# Patient Record
Sex: FEMALE
[De-identification: names, ages, dates, MRNs, and addresses within clinical notes are randomized per-mention and may not be internally consistent; named-entity substitution may affect disease eponyms.]

---

## 2018-10-20 ENCOUNTER — HOSPITAL ENCOUNTER (EMERGENCY)
Dept: HOSPITAL 88 - ER | Age: 73
Discharge: HOME | End: 2018-10-20
Payer: COMMERCIAL

## 2018-10-20 VITALS — BODY MASS INDEX: 34.15 KG/M2 | HEIGHT: 64 IN | WEIGHT: 200 LBS
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
## 2018-10-20 DIAGNOSIS — Z87.891: ICD-10-CM

## 2018-10-20 DIAGNOSIS — S42.031A: Primary | ICD-10-CM
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
## 2018-10-20 PROCEDURE — 71046 X-RAY EXAM CHEST 2 VIEWS: CPT

## 2018-10-20 PROCEDURE — 99284 EMERGENCY DEPT VISIT MOD MDM: CPT

## 2018-10-20 NOTE — DIAGNOSTIC IMAGING REPORT
SHOULDER RIGHT COMPLETE - 2 views



HISTORY:  Pain. Fell.

COMPARISON: None available.

     

FINDINGS:

Bones:

Mild comminuted distal right clavicular fracture without involvement of the AC

joint. However, this does extend to the coracoclavicular joint.

   



Joints:

The joint spaces are well-maintained.



Soft tissues:

The soft tissues appear unremarkable.





IMPRESSION: 

Comminuted distal clavicular fracture with involvement of the coracoid

clavicular joint.



Signed by: Dr. Roberto Galeas M.D. on 10/20/2018 3:58 PM

## 2018-10-20 NOTE — DIAGNOSTIC IMAGING REPORT
EXAMINATION:  CHEST 2 VIEWS    



INDICATION:      ^Trauma, fall. conern left clavicle fracture



COMPARISON:  None

     

FINDINGS:  PA and lateral views



TUBES and LINES:  None.



LUNGS:  Lungs are well inflated.  There are bibasilar atelectasis.  There is no

evidence of pneumonia or pulmonary edema.



PLEURA:  No pleural effusion or pneumothorax.



HEART AND MEDIASTINUM:  The cardiomediastinal silhouette is unremarkable mild

mild cardiomegaly.    



BONES AND SOFT TISSUES:  Distal right clavicular fracture.  Soft tissues are

unremarkable.



UPPER ABDOMEN: No free air under the diaphragm.    



IMPRESSION: 

Distal right clavicular fracture.





Signed by: Dr. Roberto Galeas M.D. on 10/20/2018 3:56 PM

## 2020-07-18 ENCOUNTER — HOSPITAL ENCOUNTER (EMERGENCY)
Dept: HOSPITAL 88 - ER | Age: 75
Discharge: HOME | End: 2020-07-18
Payer: COMMERCIAL

## 2020-07-18 VITALS — BODY MASS INDEX: 34.15 KG/M2 | HEIGHT: 64 IN | WEIGHT: 200 LBS

## 2020-07-18 DIAGNOSIS — J40: Primary | ICD-10-CM

## 2020-07-18 DIAGNOSIS — R50.9: ICD-10-CM

## 2020-07-18 DIAGNOSIS — E78.5: ICD-10-CM

## 2020-07-18 DIAGNOSIS — R53.1: ICD-10-CM

## 2020-07-18 DIAGNOSIS — R53.81: ICD-10-CM

## 2020-07-18 DIAGNOSIS — N39.0: ICD-10-CM

## 2020-07-18 LAB
ALBUMIN SERPL-MCNC: 3 G/DL (ref 3.5–5)
ALBUMIN/GLOB SERPL: 0.8 {RATIO} (ref 0.8–2)
ALP SERPL-CCNC: 94 IU/L (ref 40–150)
ALT SERPL-CCNC: 15 IU/L (ref 0–55)
ANION GAP SERPL CALC-SCNC: 12.9 MMOL/L (ref 8–16)
BACTERIA URNS QL MICRO: (no result) /HPF
BASOPHILS # BLD AUTO: 0.1 10*3/UL (ref 0–0.1)
BASOPHILS NFR BLD AUTO: 0.4 % (ref 0–1)
BILIRUB UR QL: NEGATIVE
BUN SERPL-MCNC: 9 MG/DL (ref 7–26)
BUN/CREAT SERPL: 11 (ref 6–25)
CALCIUM SERPL-MCNC: 8.9 MG/DL (ref 8.4–10.2)
CHLORIDE SERPL-SCNC: 100 MMOL/L (ref 98–107)
CK MB SERPL-MCNC: 0.3 NG/ML (ref 0–5)
CK SERPL-CCNC: 32 IU/L (ref 29–168)
CLARITY UR: CLEAR
CO2 SERPL-SCNC: 26 MMOL/L (ref 22–29)
COLOR UR: YELLOW
DEPRECATED APTT PLAS QN: 29.7 SECONDS (ref 23.8–35.5)
DEPRECATED INR PLAS: 0.93
DEPRECATED NEUTROPHILS # BLD AUTO: 12.4 10*3/UL (ref 2.1–6.9)
DEPRECATED RBC URNS MANUAL-ACNC: (no result) /HPF (ref 0–5)
EGFRCR SERPLBLD CKD-EPI 2021: > 60 ML/MIN (ref 60–?)
EOSINOPHIL # BLD AUTO: 0 10*3/UL (ref 0–0.4)
EOSINOPHIL NFR BLD AUTO: 0.1 % (ref 0–6)
EPI CELLS URNS QL MICRO: (no result) /LPF
ERYTHROCYTE [DISTWIDTH] IN CORD BLOOD: 14.8 % (ref 11.7–14.4)
GLOBULIN PLAS-MCNC: 3.9 G/DL (ref 2.3–3.5)
GLUCOSE SERPLBLD-MCNC: 89 MG/DL (ref 74–118)
HCT VFR BLD AUTO: 44.9 % (ref 34.2–44.1)
HGB BLD-MCNC: 14.6 G/DL (ref 12–16)
KETONES UR QL STRIP.AUTO: NEGATIVE
LEUKOCYTE ESTERASE UR QL STRIP.AUTO: NEGATIVE
LYMPHOCYTES # BLD: 1.7 10*3/UL (ref 1–3.2)
LYMPHOCYTES NFR BLD AUTO: 10.7 % (ref 18–39.1)
LYMPHOCYTES NFR BLD MANUAL: 9 % (ref 19–48)
MAGNESIUM SERPL-MCNC: 2 MG/DL (ref 1.3–2.1)
MCH RBC QN AUTO: 29.7 PG (ref 28–32)
MCHC RBC AUTO-ENTMCNC: 32.5 G/DL (ref 31–35)
MCV RBC AUTO: 91.3 FL (ref 81–99)
MONOCYTES # BLD AUTO: 1.5 10*3/UL (ref 0.2–0.8)
MONOCYTES NFR BLD AUTO: 9.5 % (ref 4.4–11.3)
MONOCYTES NFR BLD MANUAL: 7 % (ref 3.4–9)
NEUTS SEG NFR BLD AUTO: 78.5 % (ref 38.7–80)
NEUTS SEG NFR BLD MANUAL: 84 % (ref 40–74)
NITRITE UR QL STRIP.AUTO: NEGATIVE
PLAT MORPH BLD: NORMAL
PLATELET # BLD AUTO: 158 X10E3/UL (ref 140–360)
PLATELET # BLD EST: (no result) 10*3/UL
POTASSIUM SERPL-SCNC: 3.9 MMOL/L (ref 3.5–5.1)
PROT UR QL STRIP.AUTO: NEGATIVE
PROTHROMBIN TIME: 13.7 SECONDS (ref 11.9–14.5)
RBC # BLD AUTO: 4.92 X10E6/UL (ref 3.6–5.1)
SODIUM SERPL-SCNC: 135 MMOL/L (ref 136–145)
SP GR UR STRIP: 1.02 (ref 1.01–1.02)
UROBILINOGEN UR STRIP-MCNC: 1 MG/DL (ref 0.2–1)
WBC #/AREA URNS HPF: (no result) /HPF (ref 0–5)

## 2020-07-18 PROCEDURE — 81001 URINALYSIS AUTO W/SCOPE: CPT

## 2020-07-18 PROCEDURE — 36415 COLL VENOUS BLD VENIPUNCTURE: CPT

## 2020-07-18 PROCEDURE — 71045 X-RAY EXAM CHEST 1 VIEW: CPT

## 2020-07-18 PROCEDURE — 82550 ASSAY OF CK (CPK): CPT

## 2020-07-18 PROCEDURE — 93005 ELECTROCARDIOGRAM TRACING: CPT

## 2020-07-18 PROCEDURE — 85730 THROMBOPLASTIN TIME PARTIAL: CPT

## 2020-07-18 PROCEDURE — 82553 CREATINE MB FRACTION: CPT

## 2020-07-18 PROCEDURE — 85025 COMPLETE CBC W/AUTO DIFF WBC: CPT

## 2020-07-18 PROCEDURE — 83735 ASSAY OF MAGNESIUM: CPT

## 2020-07-18 PROCEDURE — 80053 COMPREHEN METABOLIC PANEL: CPT

## 2020-07-18 PROCEDURE — 87086 URINE CULTURE/COLONY COUNT: CPT

## 2020-07-18 PROCEDURE — 99284 EMERGENCY DEPT VISIT MOD MDM: CPT

## 2020-07-18 PROCEDURE — 83880 ASSAY OF NATRIURETIC PEPTIDE: CPT

## 2020-07-18 PROCEDURE — 84484 ASSAY OF TROPONIN QUANT: CPT

## 2020-07-18 PROCEDURE — 85610 PROTHROMBIN TIME: CPT

## 2020-07-18 NOTE — DIAGNOSTIC IMAGING REPORT
EXAMINATION:  CHEST SINGLE (PORTABLE)    



INDICATION:          

^Y

^weakness, somnolence    



COMPARISON: Multiple prior chest x-ray examinations most recent dated

1/20/2018.

     

FINDINGS:  AP view   



TUBES and LINES:  None.        



LUNGS/PLEURA: Increased left basilar opacity which could be due to atelectasis

and or effusion.



HEART AND MEDIASTINUM:  Cardiac size is mildly enlarged., Unchanged    



BONES AND SOFT TISSUES:  No acute osseous lesion.  Soft tissues are

unremarkable.



UPPER ABDOMEN: No free air under the diaphragm.    



IMPRESSION: 

Increased left basilar opacity which could be due to atelectasis and or

effusion.





Signed by: Gilbert Ames MD on 7/18/2020 5:02 PM

## 2020-07-18 NOTE — EMERGENCY DEPARTMENT NOTE
History of Present Illnes


History of Present Illness


Chief Complaint:  COVID PUI


History of Present Illness


This is a 75 year old  female presents to ED via ems for c/o severe 

weakness, "just can't get up and i want to sleep constantly".


Historian:  Patient, Paramedic/EMS


Arrival Mode:  [facility]


EMS Treatment PTA:  See EMS Report


 Required:  No


Onset (how long ago):  day(s) (2)


Radiation:  Reports non-radiation


Severity:  moderate


Onset quality:  gradual


Timing of current episode:  constant


Progression:  unchanged


Chronicity:  new


Context:  Denies recent illness


Relieving factors:  none


Exacerbating factors:  none


Associated symptoms:  Reports denies other symptoms, Reports weakness; 


   Denies cough, Denies shortness of breath


Treatments prior to arrival:  none





Past Medical/Family History


Physician Review


I have reviewed the patient's past medical and family history.  Any updates have

been documented here.





Past Medical History


Recent Fever:  Yes


Clinical Suspicion of Infectio:  Yes


New/Unexplained Change in Ment:  No


Other Medical History:  


HIGH CHOLESTEROL 





ANIEXTY





HYPERLIPIDEMIA


Other Surgery:  


LEFT LEG





Social History


Smoking Cessation:  Never Smoker


Counseling Performed:  No


Alcohol Use:  None


Any Illegal Drug Use:  No


TB Exposure/Symptoms:  No


Physically hurt or threatened:  No





Family History


Family history of heart diseas:  No





Other


Last Tetanus:  UTD


Any Pre-Existing Lines (PICC,:  No





Review of Systems


Review of Systems


Constitutional:  Reports malaise, Reports weakness


EENTM:  Reports no symptoms


Cardiovascular:  Reports no symptoms


Respiratory:  Reports no symptoms


Gastrointestinal:  Reports no symptoms


Genitourinary:  Reports no symptoms


Musculoskeletal:  Reports no symptoms


Integumentary:  Reports no symptoms


Neurological:  Reports no symptoms


Psychological:  Reports no symptoms


Endocrine:  Reports no symptoms


Hematological/Lymphatic:  Reports no symptoms





Physical Exam


Related Data


Allergies:  


Coded Allergies:  


     No Known Drug Allergies (Verified  Allergy, Unknown, 10/20/18)


Triage Vital Signs





Vital Signs








  Date Time  Temp Pulse Resp B/P (MAP) Pulse Ox O2 Delivery O2 Flow Rate FiO2


 


7/18/20 14:26 99.8 95 21 130/54 97 Room Air  








Vital signs reviewed:  Yes





Physical Exam


CONSTITUTIONAL





Constitutional:  Present well-developed, Present well-nourished


HENT


HENT:  Present normocephalic, Present atraumatic, Present oropharynx 

clear/moist, Present nose normal


HENT L/R:  Present left ext ear normal, Present right ext ear normal


EYES





Eyes:  Reports PERRL, Reports conjunctivae normal


NECK


Neck:  Present ROM normal


PULMONARY


Pulmonary:  Present effort normal, Present breath sounds normal


CARDIOVASCULAR





Cardiovascular:  Present regular rhythm, Present heart sounds normal, Present 

capillary refill normal, Present normal rate


GASTROINTESTINAL





Abdominal:  Present soft, Present nontender, Present bowel sounds normal


GENITOURINARY





Genitourinary:  Present exam deferred


SKIN


Skin:  Present warm, Present dry


MUSCULOSKELETAL





Musculoskeletal:  Present ROM normal


NEUROLOGICAL





Neurological:  Present alert, Present oriented x 3, Present no gross motor or 

sensory deficits


PSYCHOLOGICAL


Psychological:  Present mood/affect normal, Present judgement normal





Results


Laboratory


Laboratory





Laboratory Tests








Test


 7/18/20


15:47 7/18/20


15:29








Lab results reviewed:  Yes


Laboratory comments





Laboratory Tests








Test


 7/18/20


17:49 7/18/20


15:47 7/18/20


15:29


 


White Blood Count


 


 


 15.82 x10e3/uL


(4.8-10.8)


 


Red Blood Count


 


 


 4.92 x10e6/uL


(3.6-5.1)


 


Hemoglobin


 


 


 14.6 g/dL


(12.0-16.0)


 


Hematocrit


 


 


 44.9 %


(34.2-44.1)


 


Mean Corpuscular Volume


 


 


 91.3 fL


(81-99)


 


Mean Corpuscular Hemoglobin


 


 


 29.7 pg


(28-32)


 


Mean Corpuscular Hemoglobin


Concent 


 


 32.5 g/dL


(31-35)


 


Red Cell Distribution Width


 


 


 14.8 %


(11.7-14.4)


 


Platelet Count


 


 


 158 x10e3/uL


(140-360)


 


Neutrophils (%) (Auto)


 


 


 78.5 %


(38.7-80.0)


 


Lymphocytes (%) (Auto)


 


 


 10.7 %


(18.0-39.1)


 


Monocytes (%) (Auto)


 


 


 9.5  %


(4.4-11.3)


 


Eosinophils (%) (Auto)


 


 


 0.1 %


(0.0-6.0)


 


Basophils (%) (Auto)


 


 


 0.4 %


(0.0-1.0)


 


Neutrophils # (Auto)   12.4 (2.1-6.9) 


 


Lymphocytes # (Auto)   1.7 (1.0-3.2) 


 


Monocytes # (Auto)   1.5 (0.2-0.8) 


 


Eosinophils # (Auto)   0.0 (0.0-0.4) 


 


Basophils # (Auto)   0.1 (0.0-0.1) 


 


Absolute Immature Granulocyte


(auto 


 


 0.13 x10e3/uL


(0-0.1)


 


Prothrombin Time


 


 


 13.0 seconds


(11.9-14.5)


 


Prothromb Time International


Ratio 


 


 0.93 





 


Activated Partial


Thromboplast Time 


 


 23.3 seconds


(23.8-35.5)


 


Sodium Level


 


 


 135 mmol/L


(136-145)


 


Potassium Level


 


 


 3.9 mmol/L


(3.5-5.1)


 


Chloride Level


 


 


 100 mmol/L


()


 


Carbon Dioxide Level


 


 


 26 mmol/L


(22-29)


 


Anion Gap


 


 


 12.9 mmol/L


(8-16)


 


Blood Urea Nitrogen   9 mg/dL (7-26) 


 


Creatinine


 


 


 0.81 mg/dL


(0.57-1.11)


 


Estimat Glomerular Filtration


Rate 


 


 > 60 ML/MIN


(60-)


 


BUN/Creatinine Ratio   11 (6-25) 


 


Glucose Level


 


 


 89 mg/dL


()


 


Calcium Level


 


 


 8.9 mg/dL


(8.4-10.2)


 


Magnesium Level


 


 


 2.0 MG/DL


(1.3-2.1)


 


Total Bilirubin


 


 


 2.0 mg/dL


(0.2-1.2)


 


Aspartate Amino Transf


(AST/SGOT) 


 


 20 IU/L (5-34) 





 


Alanine Aminotransferase


(ALT/SGPT) 


 


 15 IU/L (0-55) 





 


Alkaline Phosphatase


 


 


 94 IU/L


()


 


Creatine Kinase


 


 


 32 IU/L


()


 


Creatine Kinase MB


 


 


 0.30 ng/mL


(0-5.0)


 


Troponin I


 


 


 0.014 ng/mL


(0-0.300)


 


B-Type Natriuretic Peptide


 


 


 37.5 pg/mL


(0-100)


 


Total Protein


 


 


 6.9 g/dL


(6.5-8.1)


 


Albumin


 


 


 3.0 g/dL


(3.5-5.0)


 


Globulin


 


 


 3.9 g/dL


(2.3-3.5)


 


Albumin/Globulin Ratio   0.8 (0.8-2.0) 











Imaging


Imaging results reviewed:  Yes





Procedures


12 Lead ECG Interpretation


ECG Interpretation :  


   ECG:  ECG 1


   :  Interpreted by ED physician


   Date:  Jul 18, 2020


   Time:  15:43


   Rhythm:  sinus rhythm


   Rate:  normal (91)


   QRS axis:  normal


   ST segments normal:  Yes


   T waves normal:  Yes


   Clinical Impression:  normal ECG





Assessment & Plan


Medical Decision Making


MDM


gen weakness, somnolence - cbc, chems, ecg, cardiacs, blood cx's, ua/cx, cxr, 

covid swab - r/o uti, pneumonia, covid19, electrolyte abnl, stemi/nstemi





Reassessment


Reassessment


improved - ua still pending - report to Dr alvarado to f/u





PT WANTS TO GO HOME - WILL GIVE ZPACK AND OMNICEF





Assessment & Plan


Final Impression:  


(1) Malaise


(2) UTI (urinary tract infection)


(3) Bronchitis


Depart Disposition:  HOME, SELF-CARE


Last Vital Signs











  Date Time  Temp Pulse Resp B/P (MAP) Pulse Ox O2 Delivery O2 Flow Rate FiO2


 


7/18/20 14:26 99.8 95 21 130/54 97 Room Air  








Home Meds


Active Scripts


Acetaminophen With Codeine (TYLENOL WITH CODEINE #3 TABLET) 1 Each Tablet, 300 

MG PO Q4H PRN for PAIN, #20 TAB


   Prov:JORJE SINGLETON NP         10/20/18


Reported Medications


Citalopram Hydrobromide (CITALOPRAM HBR) 20 Mg Tablet, 20 MG PO DAILY, TAB


   9/21/16


Latanoprost (LATANOPROST) 2.5 Ml Drops, 1 DROP OP DAILY, BOTTLE


   9/21/16


Ondansetron (ZOFRAN ODT) 4 Mg Tab.rapdis, 4 MG SL Q6H PRN for NAUSEA AND 

VOMITING, TAB


   9/21/16


Tramadol Hcl (ULTRAM) 50 Mg Tablet, 50 MG PO BID, TAB


   9/21/16


Pravastatin Sodium (PRAVASTATIN SODIUM) 40 Mg Tablet, 40 MG PO DAILY


   9/21/16


Medications in the ED





Ondansetron HCl 4 mg ONCE  STAT IV  Last administered on 7/18/20at 15:31; Admin 

Dose 4 MG;  Start 7/18/20 at 14:52;  Stop 7/18/20 at 15:12;  Status DC


Sodium Chloride 1,000 ml @  0 mls/hr Q0M STAT IV  Last administered on 7/18/20at

15:31; Admin Dose 999 MLS/HR;  Start 7/18/20 at 14:52;  Stop 7/18/20 at 14:59;  

Status DC











SHERIDAN LEUNG MD               Jul 18, 2020 16:26

## 2023-05-28 ENCOUNTER — HOSPITAL ENCOUNTER (EMERGENCY)
Dept: HOSPITAL 88 - ER | Age: 78
Discharge: HOME | End: 2023-05-28
Payer: MEDICARE

## 2023-05-28 VITALS — BODY MASS INDEX: 34.15 KG/M2 | WEIGHT: 200 LBS | HEIGHT: 64 IN

## 2023-05-28 VITALS — OXYGEN SATURATION: 98 %

## 2023-05-28 DIAGNOSIS — R35.0: ICD-10-CM

## 2023-05-28 DIAGNOSIS — E78.5: ICD-10-CM

## 2023-05-28 DIAGNOSIS — F41.9: ICD-10-CM

## 2023-05-28 DIAGNOSIS — R30.0: Primary | ICD-10-CM

## 2023-05-28 LAB
BACTERIA URNS QL MICRO: (no result) /HPF
CLARITY UR: (no result)
COLOR UR: YELLOW
DEPRECATED RBC URNS MANUAL-ACNC: (no result) /HPF (ref 0–5)
EPI CELLS URNS QL MICRO: (no result) /LPF
KETONES UR QL STRIP.AUTO: (no result)
LEUKOCYTE ESTERASE UR QL STRIP.AUTO: NEGATIVE
NITRITE UR QL STRIP.AUTO: NEGATIVE
PROT UR QL STRIP.AUTO: NEGATIVE
SP GR UR STRIP: 1.01 (ref 1.01–1.02)
UROBILINOGEN UR STRIP-MCNC: 0.2 MG/DL (ref 0.2–1)
WBC #/AREA URNS HPF: (no result) /HPF (ref 0–5)

## 2023-05-28 PROCEDURE — 99284 EMERGENCY DEPT VISIT MOD MDM: CPT

## 2023-05-28 PROCEDURE — 87086 URINE CULTURE/COLONY COUNT: CPT

## 2023-05-28 PROCEDURE — 81001 URINALYSIS AUTO W/SCOPE: CPT

## 2025-03-05 ENCOUNTER — HOSPITAL ENCOUNTER (EMERGENCY)
Dept: HOSPITAL 88 - ER | Age: 80
Discharge: HOME | End: 2025-03-05
Payer: MEDICARE

## 2025-03-05 VITALS — BODY MASS INDEX: 21.66 KG/M2 | HEIGHT: 65 IN | WEIGHT: 130 LBS

## 2025-03-05 VITALS
DIASTOLIC BLOOD PRESSURE: 79 MMHG | SYSTOLIC BLOOD PRESSURE: 116 MMHG | HEART RATE: 68 BPM | RESPIRATION RATE: 16 BRPM | TEMPERATURE: 97.5 F | OXYGEN SATURATION: 98 %

## 2025-03-05 VITALS — TEMPERATURE: 97.8 F | HEART RATE: 75 BPM
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
## 2025-03-05 DIAGNOSIS — E78.00: ICD-10-CM
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
## 2025-03-05 DIAGNOSIS — S01.111A: Primary | ICD-10-CM
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
## 2025-03-05 DIAGNOSIS — F41.9: ICD-10-CM

## 2025-03-05 DIAGNOSIS — S80.211A: ICD-10-CM

## 2025-03-05 DIAGNOSIS — E78.5: ICD-10-CM

## 2025-03-05 DIAGNOSIS — S51.011A: ICD-10-CM

## 2025-03-05 PROCEDURE — 90471 IMMUNIZATION ADMIN: CPT

## 2025-03-05 PROCEDURE — 99284 EMERGENCY DEPT VISIT MOD MDM: CPT

## 2025-03-05 PROCEDURE — 73560 X-RAY EXAM OF KNEE 1 OR 2: CPT

## 2025-03-05 PROCEDURE — 90714 TD VACC NO PRESV 7 YRS+ IM: CPT

## 2025-03-05 PROCEDURE — 72125 CT NECK SPINE W/O DYE: CPT

## 2025-03-05 PROCEDURE — 12011 RPR F/E/E/N/L/M 2.5 CM/<: CPT

## 2025-03-05 PROCEDURE — 70450 CT HEAD/BRAIN W/O DYE: CPT

## 2025-03-05 RX ADMIN — CLOSTRIDIUM TETANI TOXOID ANTIGEN (FORMALDEHYDE INACTIVATED) AND CORYNEBACTERIUM DIPHTHERIAE TOXOID ANTIGEN (FORMALDEHYDE INACTIVATED) ONE ML: 5; 2 SUSPENSION INTRAMUSCULAR at 13:45

## 2025-03-05 RX ADMIN — LIDOCAINE HYDROCHLORIDE STA ML: 10 INJECTION, SOLUTION INFILTRATION; PERINEURAL at 13:44

## 2025-06-27 ENCOUNTER — HOSPITAL ENCOUNTER (OUTPATIENT)
Dept: HOSPITAL 88 - PT | Age: 80
LOS: 3 days | End: 2025-06-30
Attending: PHYSICIAN ASSISTANT
Payer: MEDICARE

## 2025-06-27 DIAGNOSIS — R26.81: ICD-10-CM

## 2025-06-27 DIAGNOSIS — M17.11: Primary | ICD-10-CM

## 2025-07-24 ENCOUNTER — HOSPITAL ENCOUNTER (OUTPATIENT)
Dept: HOSPITAL 88 - PT | Age: 80
LOS: 7 days | End: 2025-07-31
Attending: PHYSICIAN ASSISTANT
Payer: MEDICARE

## 2025-07-24 DIAGNOSIS — M17.11: Primary | ICD-10-CM

## 2025-07-24 DIAGNOSIS — R26.89: ICD-10-CM
